# Patient Record
Sex: FEMALE | Race: WHITE | Employment: OTHER | URBAN - METROPOLITAN AREA
[De-identification: names, ages, dates, MRNs, and addresses within clinical notes are randomized per-mention and may not be internally consistent; named-entity substitution may affect disease eponyms.]

---

## 2018-04-26 ENCOUNTER — HOSPITAL ENCOUNTER (OUTPATIENT)
Dept: ULTRASOUND IMAGING | Age: 53
Discharge: HOME | End: 2018-04-28
Payer: COMMERCIAL

## 2018-04-26 DIAGNOSIS — E04.1 NONTOXIC SINGLE THYROID NODULE: ICD-10-CM

## 2018-04-26 PROCEDURE — 76536 US EXAM OF HEAD AND NECK: CPT

## 2018-06-05 ENCOUNTER — HOSPITAL ENCOUNTER (OUTPATIENT)
Dept: GENERAL RADIOLOGY | Age: 53
Discharge: HOME | End: 2018-06-07
Payer: COMMERCIAL

## 2018-06-05 ENCOUNTER — HOSPITAL ENCOUNTER (OUTPATIENT)
Dept: ULTRASOUND IMAGING | Age: 53
Discharge: HOME | End: 2018-06-07
Payer: COMMERCIAL

## 2018-06-05 ENCOUNTER — HOSPITAL ENCOUNTER (OUTPATIENT)
Age: 53
Discharge: HOME | End: 2018-06-07
Payer: COMMERCIAL

## 2018-06-05 DIAGNOSIS — M25.561 RIGHT KNEE PAIN, UNSPECIFIED CHRONICITY: ICD-10-CM

## 2018-06-05 DIAGNOSIS — R60.9 EDEMA, UNSPECIFIED TYPE: ICD-10-CM

## 2018-06-05 PROCEDURE — 93971 EXTREMITY STUDY: CPT

## 2018-06-05 PROCEDURE — 73560 X-RAY EXAM OF KNEE 1 OR 2: CPT

## 2018-10-30 ENCOUNTER — OFFICE VISIT (OUTPATIENT)
Dept: ORTHOPEDIC SURGERY | Age: 53
End: 2018-10-30
Payer: COMMERCIAL

## 2018-10-30 VITALS
HEIGHT: 67 IN | WEIGHT: 225 LBS | SYSTOLIC BLOOD PRESSURE: 132 MMHG | DIASTOLIC BLOOD PRESSURE: 86 MMHG | TEMPERATURE: 98 F | BODY MASS INDEX: 35.31 KG/M2 | HEART RATE: 58 BPM

## 2018-10-30 DIAGNOSIS — M19.011 ARTHRITIS OF RIGHT ACROMIOCLAVICULAR JOINT: Primary | ICD-10-CM

## 2018-10-30 PROCEDURE — 20610 DRAIN/INJ JOINT/BURSA W/O US: CPT | Performed by: ORTHOPAEDIC SURGERY

## 2018-10-30 PROCEDURE — 99203 OFFICE O/P NEW LOW 30 MIN: CPT | Performed by: ORTHOPAEDIC SURGERY

## 2018-10-30 RX ORDER — TRIAMCINOLONE ACETONIDE 40 MG/ML
20 INJECTION, SUSPENSION INTRA-ARTICULAR; INTRAMUSCULAR ONCE
Status: COMPLETED | OUTPATIENT
Start: 2018-10-30 | End: 2018-10-30

## 2018-10-30 RX ORDER — METFORMIN HYDROCHLORIDE 750 MG/1
TABLET, EXTENDED RELEASE ORAL
Refills: 3 | COMMUNITY
Start: 2018-10-13 | End: 2021-04-27

## 2018-10-30 RX ORDER — BUPIVACAINE HYDROCHLORIDE 2.5 MG/ML
2 INJECTION, SOLUTION INFILTRATION; PERINEURAL ONCE
Status: COMPLETED | OUTPATIENT
Start: 2018-10-30 | End: 2018-10-30

## 2018-10-30 RX ADMIN — BUPIVACAINE HYDROCHLORIDE 5 MG: 2.5 INJECTION, SOLUTION INFILTRATION; PERINEURAL at 15:03

## 2018-10-30 RX ADMIN — TRIAMCINOLONE ACETONIDE 20 MG: 40 INJECTION, SUSPENSION INTRA-ARTICULAR; INTRAMUSCULAR at 15:05

## 2018-10-31 NOTE — PROGRESS NOTES
Chief Complaint:   Chief Complaint   Patient presents with    Shoulder Pain     Right shoulder pain x 2 1/2 months. Pt. was going to the gym doing weights and feels this may have aggravated the R shoulder. Pt. went to massotherapist and therapist stated its \"crunchy\". No xray. Pt. finished Medrol Dose Rosendo and did not have any relief. HPI     Theador Salima is a 46 y.o. female, who presents With 2 to three-month history of right shoulder pain, no specific injury although the patient thinks may relate to her activities and working out with weights. Denies previous problems that shoulder no other joint complaints. She had massive therapy without relief. She has taken oral steroids without benefit either. She is right-hand dominant self-employed in sales. Denies numbness tingling in the hand. Allergies; medications; past medical, surgical, family, and social history; and problem list have been reviewed today and updated as indicated in this encounter - see below following Ortho specifics. Musculoskeletal: Left upper extremity intact, right shoulder shows full active and passive motion with pain at the extremes especially of extension and abduction. Acromioclavicular joint is prominent and highly tender which reproduces the majority of her pain. Crossarm test positive, biceps intact. No motor sensory deficits in the hands. Radiologic Studies: AP lateral x-rays of the right shoulder today show the glenohumeral joint intact, subacromial space intact, however acromioclavicular joint shows advanced degenerative disease with joint space narrowing and significant spur formation consistent with isolated acromioclavicular arthritis right shoulder. ASSESSMENT/PLAN:    Isidro Dorman was seen today for shoulder pain.     Diagnoses and all orders for this visit:    Arthritis of right acromioclavicular joint  -     XR SHOULDER RIGHT (MIN 2 VIEWS)  -     LA ARTHROCENTESIS ASPIR&/INJ INTERM JT/BURS W/O US    Other orders  -

## 2019-11-22 ENCOUNTER — HOSPITAL ENCOUNTER (OUTPATIENT)
Dept: ULTRASOUND IMAGING | Age: 54
Discharge: HOME | End: 2019-11-24
Payer: COMMERCIAL

## 2019-11-22 DIAGNOSIS — E04.1 NONTOXIC SINGLE THYROID NODULE: ICD-10-CM

## 2019-11-22 PROCEDURE — 76536 US EXAM OF HEAD AND NECK: CPT

## 2020-07-27 ENCOUNTER — HOSPITAL ENCOUNTER (OUTPATIENT)
Dept: DIABETES SERVICES | Age: 55
Setting detail: THERAPIES SERIES
Discharge: HOME | End: 2020-07-27
Payer: COMMERCIAL

## 2020-07-27 VITALS — TEMPERATURE: 98.8 F | BODY MASS INDEX: 37.61 KG/M2 | HEIGHT: 67 IN | WEIGHT: 239.6 LBS

## 2020-07-27 PROCEDURE — 97802 MEDICAL NUTRITION INDIV IN: CPT | Performed by: DIETITIAN, REGISTERED

## 2020-07-27 SDOH — ECONOMIC STABILITY: FOOD INSECURITY: ADDITIONAL INFORMATION: NO

## 2020-07-27 ASSESSMENT — PATIENT HEALTH QUESTIONNAIRE - PHQ9
2. FEELING DOWN, DEPRESSED OR HOPELESS: 0
SUM OF ALL RESPONSES TO PHQ9 QUESTIONS 1 & 2: 0
SUM OF ALL RESPONSES TO PHQ QUESTIONS 1-9: 0
SUM OF ALL RESPONSES TO PHQ QUESTIONS 1-9: 0
1. LITTLE INTEREST OR PLEASURE IN DOING THINGS: 0

## 2020-07-27 NOTE — LETTER
or [phone] [facility]).         Sincerely,         Registered Dietitian Nutritionists:          [x]  Scot Olszewski, RDN LD CDE          []   Clive De La Rosa MS NO FRASER  []  NO Lindsay  []   NO BOREGON CDE          Diabetes

## 2020-07-28 NOTE — PROGRESS NOTES
Diabetes Self-Management Education Record    Participant Name: Joaquina Luis  Referring Provider: Patricio Coyne MD  Assessment/Evaluation Ratings:  1=Needs Instruction   4=Demonstrates Understanding/Competency  2=Needs Review   NC=Not Covered    3=Comprehends Key Points  N/A=Not Applicable  Topics/Learning Objectives Pre-session Assess Date:  7/27/20-MICHELLE Son Date Follow-up Date Post- session Eval Comments   Diabetes disease process & Treatment process:   -Define diabetes & prediabetes and ABCs of     diabetes   -Identify own type of diabetes  -Identify lifestyle changes/treatment options 1    Type 2 newly diagnosed   Developing strategies for Healthy coping/psychosocial issues:    -Describe feelings about living with diabetes  -Identify coping strategies;   -Identify support needed & support network available 1          PHQ-2 Depression Screen Score: 0    PHQ-9 Score:   []Physician notified of suicidal ideations   Prevention, detection & treatment of Chronic complications:    -Identify the prevention, detection and treatment for complications including immunizations, preventive eye, foot, dental and renal exams as indicated per the participant's duration of diabetes and health status.  -Define the natural course of diabetes and the relationship of blood glucose levels to long term complications of diabetes.   1       Prevention, detection & treatment of acute complications:    -List symptoms of hyper & hypoglycemia, and prevention & treatment strategies.   -Describe sick day guidelines  DKA /indications for ketone testing &  when to call physician  1       Identify severe weather/situation crisis  & diabetes supplies management        Using medications safely:   -State effects of diabetes medicines on blood glucose levels;  -List diabetes medication taken, action & side effects 1    Pt has been on Ozempic took 6 doses, but insurance will not cover, so will stop, too expensive   Insulin/Injectables  -Name appropriate injection sites; proper storage; supplies needed;          Demonstrate proper technique        Monitoring blood glucose, interpreting and using results:   -Identify recommended & personal blood glucose targets & HgbA1C target levels  -State the Importance of logging blood glucose levels for pattern recognition;   -State benefits of reading/using pt generated health data  -Verbalize safe lancet disposal 1    Pt not monitoring, A1C 6.5%   -Demonstrate proper testing technique        Incorporating physical activity into lifestyle:   -State effect of exercise on blood glucose levels;   -State benefits of regular exercise;   -Define safety considerations;  -Describe contraindications/maintenance of activity. 1    Pt takes walks   Incorporating nutritional management into lifestyle:   -Describe effect of type, amount & timing of food on blood glucose  -Describe methods for preparing and planning healthy meals  Correctly read food labels 1 7/27/2020-JA   7/27/20- Discussed general healthy eating, food groups high in carbohydrate, label reading and plate method. Pt has been attending weight watchers online. Keeping track of portions. Plan personal carbohydrate-controlled meal based on individualized meal plan  Demonstrate CHO counting/portion control  1 7/27/2020-MICHELLE   7/27/2020 Instructed on 1500 calorie meal plan, 2 carbohydrate at breakfast and lunch and 3 at dinner. 1 in between lunch and dinner 1 after dinner. 8 oz protein 4 fats daily. Provided sample meals and snacks. Snack idea list provided and meal plan food list trifold. Developing strategies for problem solving to promote health/change behavior. -Identify 7 self-care behaviors; Personal health risk factors; Benefits, challenges & strategies for behavioral change and set an individualized goal selection.  1    Goal:  Consistent carbohydrate at each meal and snack     Identified Barriers to learning/adherence to self management plan:    None  [] other    Instruction Method:  Lecture/Discussion and Handouts    Supporting Education Materials/Equipment Provided: Meal Plan and Nutritional Packet   []Maltese materials       [] services     []Other:      Encounter Type Date Attended Start Time End Time Comments No Show Dates   Assessment 7/27/20-JA 12:00 12:15       Session 1         Session 2        Session 3         In person Follow-up         Gestational Diabetes         Individual MNT 7/27/20-JA 12:15 13:00     Meter Instrx        Insulin Instrx           Additional Comments:    Date:           DSMES Follow-up plan based on patients DSMES goal    Dr Notified by [] EMR []Fax        [x]HgbA1C   []Weight   []Hypertension  []Follow-up with Physician  []Medication compliance   []Plate method/meal plan compliance   []Self-Foot Exam Frequency   []Monitoring Frequency   []Exercise Routine   []Goal Attainment       []Patient lost to follow-up  Dr Notified by []EMR []Fax     Personal Support Plan:      [x] Keep all scheduled doctor appointments   [] Make and keep appointments with specialists (foot, eye, dentist) as recommended   [] Consult my pharmacist about all new medications or to ask any medication questions   [] Get tested for sleep apnea   [] Seek help for:   [] Make an appointment with:   [] Attend smoking cessation classes or call 1-800-QUIT-NOW  [] Attend Diabetes Support Group   [] Use diabetes magazines, books, or credible web-sites like the ADA for more information  [] Increase exercise at home or join an exercise program:   [] Other:

## 2021-01-25 ENCOUNTER — HOSPITAL ENCOUNTER (OUTPATIENT)
Dept: ULTRASOUND IMAGING | Age: 56
Discharge: HOME | End: 2021-01-27
Payer: COMMERCIAL

## 2021-01-25 DIAGNOSIS — E04.1 NONTOXIC UNINODULAR GOITER: ICD-10-CM

## 2021-01-25 PROCEDURE — 76536 US EXAM OF HEAD AND NECK: CPT

## 2021-04-21 ENCOUNTER — HOSPITAL ENCOUNTER (OUTPATIENT)
Age: 56
Discharge: HOME | End: 2021-04-23
Payer: COMMERCIAL

## 2021-04-21 PROCEDURE — U0003 INFECTIOUS AGENT DETECTION BY NUCLEIC ACID (DNA OR RNA); SEVERE ACUTE RESPIRATORY SYNDROME CORONAVIRUS 2 (SARS-COV-2) (CORONAVIRUS DISEASE [COVID-19]), AMPLIFIED PROBE TECHNIQUE, MAKING USE OF HIGH THROUGHPUT TECHNOLOGIES AS DESCRIBED BY CMS-2020-01-R: HCPCS

## 2021-04-22 LAB
SARS-COV-2: NOT DETECTED
SOURCE: NORMAL

## 2021-04-27 ENCOUNTER — HOSPITAL ENCOUNTER (OUTPATIENT)
Dept: ULTRASOUND IMAGING | Age: 56
Discharge: HOME | End: 2021-04-29
Payer: COMMERCIAL

## 2021-04-27 VITALS
HEART RATE: 59 BPM | DIASTOLIC BLOOD PRESSURE: 68 MMHG | RESPIRATION RATE: 16 BRPM | TEMPERATURE: 97.7 F | OXYGEN SATURATION: 96 % | SYSTOLIC BLOOD PRESSURE: 141 MMHG

## 2021-04-27 DIAGNOSIS — E04.1 THYROID NODULE: ICD-10-CM

## 2021-04-27 PROCEDURE — 88173 CYTOPATH EVAL FNA REPORT: CPT

## 2021-04-27 PROCEDURE — 88305 TISSUE EXAM BY PATHOLOGIST: CPT

## 2021-04-27 PROCEDURE — 10005 FNA BX W/US GDN 1ST LES: CPT

## 2021-04-27 NOTE — FLOWSHEET NOTE
IRFLOWSHEET    Date: 4/27/2021    Time: 8:59 AM     Exam: Ultrasound guided left thyroid biopsy    Radiologist Performing Procedure: Dr. Nina Coello    Nurse Reporting/Phone: [id number]     Nurse Receiving: Umesh Erps    Permit signed:      Yes    ID Band Checklist: Yes    Allergies: Patient has no known allergies. TIME OUT: 1639    PROCEDURE START TIME: 6673    Puncture Site: left neck    Puncture Time: 0923    Catheters: 25 gauge needle     LABS: No results found for: INR, PROTIME      No results found for: CREATININE, BUN     No results found for: HGB, HCT, PLT      PROCEDURE END TIME: 0937    Total Contrast: NA    Fluoroscopy Time: NA    Complications:  None    Comments:  Patient arrival from home to ultrasound for thyroid biopsy. Consent signed, Dr. Nina Coello in to speak with the patient about the procedure. Patient placed supine on cart, left neck prepped and draped. Using ultrasound, needle inserted to left neck and fine needle aspiration x 5 taken by Dr. Nina Coello and given directly to pathologist. Procedure completed @ 2181. Site cleansed and band aid applied. No complications noted. Discharge instructions reviewed and understood by patient, patient discharged home.